# Patient Record
Sex: FEMALE | Race: WHITE | Employment: PART TIME | URBAN - METROPOLITAN AREA
[De-identification: names, ages, dates, MRNs, and addresses within clinical notes are randomized per-mention and may not be internally consistent; named-entity substitution may affect disease eponyms.]

---

## 2017-08-02 PROBLEM — F41.1 GENERALIZED ANXIETY DISORDER: Status: ACTIVE | Noted: 2017-08-02

## 2019-03-20 ENCOUNTER — NURSE ONLY (OUTPATIENT)
Dept: SURGERY | Facility: CLINIC | Age: 64
End: 2019-03-20

## 2019-03-20 NOTE — PROGRESS NOTES
For future RN reference:  Pathology report received from Dr. Maritza Carreon office. The patient is scheduled for Mohs on Tuesday May 7 at 8:45 with Dr. Leland Liu. When is the patient coming in for a consultation?      Thank you,   Debra Bonilla     Mohs Surgery

## 2019-04-17 ENCOUNTER — OFFICE VISIT (OUTPATIENT)
Dept: SURGERY | Facility: CLINIC | Age: 64
End: 2019-04-17
Payer: COMMERCIAL

## 2019-04-17 VITALS — HEIGHT: 63 IN | WEIGHT: 152.63 LBS | BODY MASS INDEX: 27.04 KG/M2

## 2019-04-17 DIAGNOSIS — C44.310 FACIAL BASAL CELL CANCER: Primary | ICD-10-CM

## 2019-04-17 PROCEDURE — 99243 OFF/OP CNSLTJ NEW/EST LOW 30: CPT | Performed by: SURGERY

## 2019-04-17 NOTE — PATIENT INSTRUCTIONS
Surgeon: Dr. Donell Pate.  Everett Claudio, PhD     Tel:  [phone]    Fax: [phone]     Surgery/Procedure: Reconstruction of nasal defect with local flap, possible skin graft, possible forehead flap, 2 hours, general anesthesia       Hospital:  [facility]:

## 2019-04-17 NOTE — PROGRESS NOTES
Surgery and wash instructions verbally reviewed with the patient and written instructions were also provided. The patient understands the need to obtain medical clearance for this procedure and plans to see Dr. Grisel Bradley for this.      Informed consent for

## 2019-04-17 NOTE — CONSULTS
New Patient Consultation    Chief Complaint:  No chief complaint on file. History of Present Illness: Jayson Prakash is a 59year old female referred by Dr. Flori Green for post MOhs nasal reconstruction.    The patient has a history of a basal cell cancer on Problem Relation Age of Onset   • Cancer Father          at 80   • Heart Surgery Father         CABG 68   • Dementia Mother    • Heart Attack Brother 77         Social History:  Social History    Socioeconomic History      Marital status:  get up at night to urinate, urinary hesitancy or retaining urine, painful urination, urinary incontinence, decreased urine stream, blood in the urine, or vaginal/penile discharge.   Skin:   The patient denies rash, itching, skin lesions, dry skin, change in Extremities unremarkable, without edema, tenderness or deformities        Impression:   Janine Ahuja  is a 59year old female with recurrent BCCA nose      Discussion and Plan:  The patient was counseled on the different treatment options.      I discussed further surgery. Patient understands and wishes to proceed with the procedure, questions were answered. Emeli Etienne MD  4/17/2019  3:58 PM

## 2019-04-22 ENCOUNTER — TELEPHONE (OUTPATIENT)
Dept: SURGERY | Facility: CLINIC | Age: 64
End: 2019-04-22

## 2019-05-08 PROCEDURE — 81003 URINALYSIS AUTO W/O SCOPE: CPT | Performed by: FAMILY MEDICINE

## 2019-05-17 ENCOUNTER — TELEPHONE (OUTPATIENT)
Dept: SURGERY | Facility: CLINIC | Age: 64
End: 2019-05-17

## 2019-05-17 NOTE — TELEPHONE ENCOUNTER
PT questioning need for EKG prior to surgery with Dr. Cristina Alfaro on 5/21. Instructed pt that anesthesiology ([facility]) is requesting and to call them directly at [phone] for further questions. Dr. Otis Mosley did clear patient for surgery.

## 2019-05-18 ENCOUNTER — ANESTHESIA EVENT (OUTPATIENT)
Dept: SURGERY | Facility: HOSPITAL | Age: 64
End: 2019-05-18
Payer: COMMERCIAL

## 2019-05-21 ENCOUNTER — HOSPITAL ENCOUNTER (OUTPATIENT)
Facility: HOSPITAL | Age: 64
Setting detail: HOSPITAL OUTPATIENT SURGERY
Discharge: HOME | End: 2019-05-21
Attending: SURGERY | Admitting: SURGERY
Payer: COMMERCIAL

## 2019-05-21 ENCOUNTER — ANESTHESIA (OUTPATIENT)
Dept: SURGERY | Facility: HOSPITAL | Age: 64
End: 2019-05-21
Payer: COMMERCIAL

## 2019-05-21 VITALS
HEIGHT: 64 IN | OXYGEN SATURATION: 98 % | DIASTOLIC BLOOD PRESSURE: 78 MMHG | BODY MASS INDEX: 25.7 KG/M2 | TEMPERATURE: 98 F | RESPIRATION RATE: 16 BRPM | SYSTOLIC BLOOD PRESSURE: 148 MMHG | WEIGHT: 150.56 LBS | HEART RATE: 77 BPM

## 2019-05-21 DIAGNOSIS — C44.310 FACIAL BASAL CELL CANCER: Primary | ICD-10-CM

## 2019-05-21 PROCEDURE — 0HX1XZZ TRANSFER FACE SKIN, EXTERNAL APPROACH: ICD-10-PCS | Performed by: SURGERY

## 2019-05-21 PROCEDURE — 09RKX7Z REPLACEMENT OF NASAL MUCOSA AND SOFT TISSUE WITH AUTOLOGOUS TISSUE SUBSTITUTE, EXTERNAL APPROACH: ICD-10-PCS | Performed by: SURGERY

## 2019-05-21 RX ORDER — CEFAZOLIN SODIUM/WATER 2 G/20 ML
2 SYRINGE (ML) INTRAVENOUS ONCE
Status: COMPLETED | OUTPATIENT
Start: 2019-05-21 | End: 2019-05-21

## 2019-05-21 RX ORDER — SODIUM CHLORIDE, SODIUM LACTATE, POTASSIUM CHLORIDE, CALCIUM CHLORIDE 600; 310; 30; 20 MG/100ML; MG/100ML; MG/100ML; MG/100ML
INJECTION, SOLUTION INTRAVENOUS CONTINUOUS
Status: DISCONTINUED | OUTPATIENT
Start: 2019-05-21 | End: 2019-05-21

## 2019-05-21 RX ORDER — ACETAMINOPHEN 500 MG
1000 TABLET ORAL ONCE
Status: DISCONTINUED | OUTPATIENT
Start: 2019-05-21 | End: 2019-05-21

## 2019-05-21 RX ORDER — DEXAMETHASONE SODIUM PHOSPHATE 4 MG/ML
8 VIAL (ML) INJECTION AS NEEDED
Status: DISCONTINUED | OUTPATIENT
Start: 2019-05-21 | End: 2019-05-21

## 2019-05-21 RX ORDER — MIDAZOLAM HYDROCHLORIDE 1 MG/ML
1 INJECTION INTRAMUSCULAR; INTRAVENOUS EVERY 5 MIN PRN
Status: DISCONTINUED | OUTPATIENT
Start: 2019-05-21 | End: 2019-05-21

## 2019-05-21 RX ORDER — HYDROCODONE BITARTRATE AND ACETAMINOPHEN 5; 325 MG/1; MG/1
1 TABLET ORAL AS NEEDED
Status: DISCONTINUED | OUTPATIENT
Start: 2019-05-21 | End: 2019-05-21

## 2019-05-21 RX ORDER — HYDROCODONE BITARTRATE AND ACETAMINOPHEN 5; 325 MG/1; MG/1
2 TABLET ORAL AS NEEDED
Status: DISCONTINUED | OUTPATIENT
Start: 2019-05-21 | End: 2019-05-21

## 2019-05-21 RX ORDER — MEPERIDINE HYDROCHLORIDE 25 MG/ML
12.5 INJECTION INTRAMUSCULAR; INTRAVENOUS; SUBCUTANEOUS AS NEEDED
Status: DISCONTINUED | OUTPATIENT
Start: 2019-05-21 | End: 2019-05-21

## 2019-05-21 RX ORDER — ONDANSETRON 2 MG/ML
4 INJECTION INTRAMUSCULAR; INTRAVENOUS AS NEEDED
Status: DISCONTINUED | OUTPATIENT
Start: 2019-05-21 | End: 2019-05-21

## 2019-05-21 RX ORDER — HYDROMORPHONE HYDROCHLORIDE 1 MG/ML
0.4 INJECTION, SOLUTION INTRAMUSCULAR; INTRAVENOUS; SUBCUTANEOUS EVERY 5 MIN PRN
Status: DISCONTINUED | OUTPATIENT
Start: 2019-05-21 | End: 2019-05-21

## 2019-05-21 RX ORDER — HYDROCODONE BITARTRATE AND ACETAMINOPHEN 5; 325 MG/1; MG/1
1-2 TABLET ORAL EVERY 4 HOURS PRN
Qty: 30 TABLET | Refills: 0 | Status: SHIPPED | OUTPATIENT
Start: 2019-05-21 | End: 2019-06-12 | Stop reason: ALTCHOICE

## 2019-05-21 RX ORDER — NALOXONE HYDROCHLORIDE 0.4 MG/ML
80 INJECTION, SOLUTION INTRAMUSCULAR; INTRAVENOUS; SUBCUTANEOUS AS NEEDED
Status: DISCONTINUED | OUTPATIENT
Start: 2019-05-21 | End: 2019-05-21

## 2019-05-21 RX ORDER — ACETAMINOPHEN 500 MG
1000 TABLET ORAL ONCE
COMMUNITY
End: 2019-06-12 | Stop reason: ALTCHOICE

## 2019-05-21 RX ORDER — METOCLOPRAMIDE HYDROCHLORIDE 5 MG/ML
10 INJECTION INTRAMUSCULAR; INTRAVENOUS AS NEEDED
Status: DISCONTINUED | OUTPATIENT
Start: 2019-05-21 | End: 2019-05-21

## 2019-05-21 RX ORDER — LIDOCAINE HYDROCHLORIDE AND EPINEPHRINE 10; 10 MG/ML; UG/ML
INJECTION, SOLUTION INFILTRATION; PERINEURAL AS NEEDED
Status: DISCONTINUED | OUTPATIENT
Start: 2019-05-21 | End: 2019-05-21 | Stop reason: HOSPADM

## 2019-05-21 NOTE — ANESTHESIA POSTPROCEDURE EVALUATION
[id number] Patient Status:  Hospital Outpatient Surgery   Age/Gender 59year old female MRN [id number]   [facility] SURGERY Attending Tejinder Eason MD   Hosp Day # 0 PCP RICKI YOON DO       Anesthesia Post-op Note    P

## 2019-05-21 NOTE — ANESTHESIA PREPROCEDURE EVALUATION
PRE-OP EVALUATION    Patient Name: Blaine Rodríguez    Pre-op Diagnosis: Facial basal cell cancer [C44.310]    Procedure(s):  Reconstruction of nasal defect with local flap, possible skin graft, possible forehead flap    Surgeon(s) and Role:     * Natacha Bermudez Cardiovascular        Exercise tolerance: good     MET: >4      (+) hypertension and well controlled  (+) hyperlipidemia                                  Endo/Other    Negative endo/other ROS.                               Pulmonary    Negative pulmonary documented in the signed consent form.      Plan/risks discussed with: patient and spouse                Present on Admission:  **None**

## 2019-05-21 NOTE — H&P
Dr. Espinoza Schmidt 5/8/19 H&P/surigcal clearance note reviewed. No interval changes. Informed consent obtained. We will proceed as planned.

## 2019-05-22 NOTE — OPERATIVE REPORT
[facility]    PATIENT'S NAME: Concetta Knee   ATTENDING PHYSICIAN: Emeli Vanessa MD   OPERATING PHYSICIAN: Emeli Vanessa MD   PATIENT ACCOUNT#:   [id number]    LOCATION:  [place] [address] ED 10  MEDICAL RECORD #:   [id number]       DATE OF B to achieve optimal aesthetic outcome. OPERATIVE TECHNIQUE:  The patient was identified in the preoperative area, informed consent was obtained, the site was marked.   The patient was then brought back to the operating room, placed in the supine positio bacitracin and Xeroform to the graft. The donor site at the left retroauricular area was closed with 5-0 interrupted Vicryl sutures and 5-0 chromic running baseball sutures. Bacitracin ointment was applied.   The patient tolerated the procedure well and a

## 2019-05-29 ENCOUNTER — OFFICE VISIT (OUTPATIENT)
Dept: SURGERY | Facility: CLINIC | Age: 64
End: 2019-05-29
Payer: COMMERCIAL

## 2019-05-29 DIAGNOSIS — C44.310 FACIAL BASAL CELL CANCER: Primary | ICD-10-CM

## 2019-05-29 PROCEDURE — 99024 POSTOP FOLLOW-UP VISIT: CPT | Performed by: SURGERY

## 2019-05-30 NOTE — PROGRESS NOTES
Ana Hackett is a 59year old female who presents today for a follow-up after mohs reconstruction L nasla alar and tip/ sidewall with local flap and composit cartilage skin graft      She denies fever and chills.  She denies nausea, vomiting, diarrhea or c

## 2019-06-12 ENCOUNTER — OFFICE VISIT (OUTPATIENT)
Dept: SURGERY | Facility: CLINIC | Age: 64
End: 2019-06-12
Payer: COMMERCIAL

## 2019-06-12 DIAGNOSIS — C44.310 FACIAL BASAL CELL CANCER: Primary | ICD-10-CM

## 2019-06-12 PROCEDURE — 99024 POSTOP FOLLOW-UP VISIT: CPT | Performed by: SURGERY

## 2019-06-12 NOTE — PROGRESS NOTES
Katerin Briscoe is a 59year old female who presents today for a follow-up after nasal reconstruction with local flap and cartialge skin graft      She denies fever and chills. She denies nausea, vomiting, diarrhea or constipation. Her pain is controlled.

## 2019-07-03 ENCOUNTER — OFFICE VISIT (OUTPATIENT)
Dept: SURGERY | Facility: CLINIC | Age: 64
End: 2019-07-03
Payer: COMMERCIAL

## 2019-07-03 DIAGNOSIS — C44.310 FACIAL BASAL CELL CANCER: Primary | ICD-10-CM

## 2019-07-03 PROCEDURE — 99024 POSTOP FOLLOW-UP VISIT: CPT | Performed by: SURGERY

## 2019-07-03 NOTE — PROGRESS NOTES
Suzanne Swenson is a 59year old female who presents today for a follow-up after nasal reconstruction with local flap and skin/ cartilage graft    She denies fever and chills. She denies nausea, vomiting, diarrhea or constipation. Her pain is controlled.

## 2019-10-09 ENCOUNTER — OFFICE VISIT (OUTPATIENT)
Dept: SURGERY | Facility: CLINIC | Age: 64
End: 2019-10-09
Payer: COMMERCIAL

## 2019-10-09 VITALS — WEIGHT: 150.19 LBS | HEIGHT: 63 IN | BODY MASS INDEX: 26.61 KG/M2

## 2019-10-09 DIAGNOSIS — C44.310 FACIAL BASAL CELL CANCER: Primary | ICD-10-CM

## 2019-10-09 PROCEDURE — 99213 OFFICE O/P EST LOW 20 MIN: CPT | Performed by: SURGERY

## 2019-10-09 NOTE — CONSULTS
Estabilshed Patient Consultation    Chief Complaint: s/ p nasal reconstruction  History of Present Illness:    Charlotte Price is a 59year old female who returns to the office after mohs excision of BCCa nose and reconstruction with local flap and ear compos Systems:    The patient reports see HPI  All other systems are unremarkable.       Physical Exam:    Ht 1.6 m (5' 3\")   Wt 68.1 kg (150 lb 3.2 oz)   LMP  (LMP Unknown)   BMI 26.61 kg/m²     Constitutional: The patient is an alert, oriented and well-develop

## (undated) DEVICE — LAPAROTOMY SPONGE - RF AND X-RAY DETECTABLE PRE-WASHED: Brand: SITUATE

## (undated) DEVICE — PREP BETADINE SOL 5% EYE

## (undated) DEVICE — SUTURE PROLENE 6-0 RB-2

## (undated) DEVICE — TOWEL OR BLU 16X26 STRL

## (undated) DEVICE — HEAD AND NECK CDS-LF: Brand: MEDLINE INDUSTRIES, INC.

## (undated) DEVICE — GAMMEX® PI HYBRID SIZE 7, STERILE POWDER-FREE SURGICAL GLOVE, POLYISOPRENE AND NEOPRENE BLEND: Brand: GAMMEX

## (undated) DEVICE — OCCLUSIVE GAUZE STRIP OVERWRAP,3% BISMUTH TRIBROMOPHENATE IN PETROLATUM BLEND: Brand: XEROFORM

## (undated) DEVICE — SUTURE PROLENE 5-0 RB-1

## (undated) DEVICE — SHEET, DRAPE, SPLIT, STERILE: Brand: MEDLINE

## (undated) DEVICE — SOL  .9 1000ML BTL

## (undated) DEVICE — REM POLYHESIVE ADULT PATIENT RETURN ELECTRODE: Brand: VALLEYLAB

## (undated) DEVICE — SYRINGE 10ML LL CONTRL SYRINGE

## (undated) DEVICE — GAMMEX® PI HYBRID SIZE 6.5, STERILE POWDER-FREE SURGICAL GLOVE, POLYISOPRENE AND NEOPRENE BLEND: Brand: GAMMEX

## (undated) DEVICE — SUTURE CHROMIC GUT 5-0 RB-1

## (undated) DEVICE — MARKER SKIN PREP RESIST STRL

## (undated) DEVICE — #10 STERILE DISPOSABLE SCALPEL: Brand: DISPOSABLE SCALPEL

## (undated) DEVICE — CASED DISP BIPOLAR CORD

## (undated) DEVICE — CAUTERY BLADE 2IN INS E1455

## (undated) DEVICE — SUTURE MONOCRYL 4-0 PS-2

## (undated) DEVICE — 3M(TM) STERI-STRIP(TM) ANTIMICROBIAL SKIN CLOSURES (REINFORCED) A1846: Brand: 3M™ STERI-STRIP™

## (undated) DEVICE — SUTURE VICRYL 5-0 RB-1

## (undated) NOTE — LETTER
Dominga Middleton [address]  [place], [address] [place] [address]    Consent for Operation  Date: __________________                                Time: _______________    1.  I authorize the performance upon Emily Sampson the following operation:  Procedure(s revealed by the pictures or by descriptive texts accompanying them. If the procedure has been videotaped, the surgeon will obtain the original videotape. The hospital will not be responsible for storage or maintenance of this tape.   7. For the purpose of a THAT MY DOCTOR PROVIDED ME WITH THE ABOVE EXPLANATIONS, THAT ALL BLANKS OR STATEMENTS REQUIRING INSERTION OR COMPLETION WERE FILLED IN.     Signature of Patient:   ___________________________    When the patient is a minor or mentally incompetent to give co supplements, and pills I can buy without a prescription (including street drugs/illegal medications). Failure to inform my anesthesiologist about these medicines may increase my risk of anesthetic complications. iv.  If I am allergic to anything or have ha Anesthesiologist Signature     Date   Time  I have discussed the procedure and information above with the patient (or patient’s representative) and answered their questions. The patient or their representative has agreed to have anesthesia services.     ___